# Patient Record
Sex: FEMALE | Race: WHITE
[De-identification: names, ages, dates, MRNs, and addresses within clinical notes are randomized per-mention and may not be internally consistent; named-entity substitution may affect disease eponyms.]

---

## 2018-10-15 ENCOUNTER — HOSPITAL ENCOUNTER (INPATIENT)
Dept: HOSPITAL 74 - JLDR | Age: 28
LOS: 3 days | Discharge: HOME | End: 2018-10-18
Attending: OBSTETRICS & GYNECOLOGY | Admitting: OBSTETRICS & GYNECOLOGY
Payer: COMMERCIAL

## 2018-10-15 VITALS — BODY MASS INDEX: 32.9 KG/M2

## 2018-10-15 DIAGNOSIS — O99.113: ICD-10-CM

## 2018-10-15 DIAGNOSIS — D68.1: ICD-10-CM

## 2018-10-15 DIAGNOSIS — O34.211: Primary | ICD-10-CM

## 2018-10-15 DIAGNOSIS — D68.59: ICD-10-CM

## 2018-10-15 DIAGNOSIS — Z3A.39: ICD-10-CM

## 2018-10-15 DIAGNOSIS — Z30.2: ICD-10-CM

## 2018-10-15 DIAGNOSIS — O22.03: ICD-10-CM

## 2018-10-15 LAB
ARTERIAL BLOOD GAS PCO2: 55.9 MMHG (ref 35–45)
BASE EXCESS BLDA CALC-SCNC: -3.3 MEQ/L (ref -2–2)
PH BLDV: 7.34 [PH] (ref 7.32–7.42)
PO2 BLDA: 15.4 MMHG (ref 80–100)
SAO2 % BLDA: 28.6 % (ref 90–98.9)
VENOUS PC02: 43.1 MMHG (ref 38–52)
VENOUS PO2: 32.2 MMHG (ref 28–48)

## 2018-10-15 PROCEDURE — 0UL70DZ OCCLUSION OF BILATERAL FALLOPIAN TUBES WITH INTRALUMINAL DEVICE, OPEN APPROACH: ICD-10-PCS | Performed by: OBSTETRICS & GYNECOLOGY

## 2018-10-15 RX ADMIN — IBUPROFEN PRN MG: 800 INJECTION INTRAVENOUS at 18:17

## 2018-10-15 RX ADMIN — SODIUM CHLORIDE, SODIUM GLUCONATE, SODIUM ACETATE, POTASSIUM CHLORIDE, AND MAGNESIUM CHLORIDE SCH MLS/HR: 526; 502; 368; 37; 30 INJECTION, SOLUTION INTRAVENOUS at 08:30

## 2018-10-15 NOTE — OP
DATE OF OPERATION:  10/15/2018

 

PREOPERATIVE DIAGNOSIS:  Pregnancy at estimated gestational age of 39 weeks. 

Previous  section x3.  Sterilization.  

 

POSTOPERATIVE DIAGNOSIS:  Pregnancy at estimated gestational age of 39 weeks. 

Previous  section x3.  Sterilization.  

 

PROCEDURE:  Repeat low transverse  section, bilateral tubal ligation via

modified Hadley method.

 

SURGEON:  Kaleb Rae M.D.

 

ASSISTANT:  America Cason M.D. 

 

ANESTHESIOLOGIST:  Aleksey Varma M.D. 

 

ANESTHESIA:  Spinal.

 

COMPLICATIONS:  None.

 

INTRAVENOUS FLUIDS:  1800 mL.

 

URINE OUTPUT:  100 mL of clear urine at the end of the procedure.  

 

ESTIMATED BLOOD LOSS:  600 mL.

 

PATHOLOGY:  Placenta and fragments of bilateral fallopian tubes.  

 

FINDINGS:  Live baby girl in vertex presentation, no meconium in amniotic fluids,

nuchal cord wrapped around once, normal uterus, fallopian tubes, and ovaries.  

 

PROCEDURE:  The patient was met preoperatively.  Risks, benefits, and alternatives of

surgery were discussed in detail.  All questions were answered.  The patient was then

brought to the OR with the IV running.  She was placed on the surgical table in a

sitting position.  The spinal anesthesia was achieved without difficulty.  The

patient was then placed in the supine position with a leftward tilt.  A Dominguez

catheter was inserted and left to drain to gravity.  The patient was prepped and

draped in the usual sterile fashion.  A timeout procedure was conducted as per

standard protocol.  The surgeons then proceeded with the operation.  A Pfannenstiel

skin incision was made approximately 2 cm above the pubic symphysis along the prior

scar.  The incision was carried down to of fascia.  The fascia was incised in the

midline.  The incision was extended bilaterally using jay scissors.  The fascia was

dissected away from the rectus muscles superiorly and inferiorly using sharp

dissection.  The rectus muscles were  in the midline using sharp dissection.

 The peritoneum was identified and entered sharply.  The peritoneal incision was

extended superiorly and inferiorly using Metzenbaum scissors.  The bladder peritoneum

was then dissected away from the lower uterine segment.  The bladder was reflected

downwards.  The uterus was incised transversely in the lower uterine segment.  The

uterine incision was extended bilaterally using bandage scissors.  The baby was

delivered from vertex presentation without complication.  A nuchal cord was noted to

be wrapped around once and was released without difficulty.  The umbilical cord was

clamped and cut.  The the baby was crying spontaneously.  The baby was handed to the

waiting neonatologist.  The placenta was then expressed and delivered manually and

without complications.  The uterus was cleared of all clots and debris.  The uterine

incision was repaired using a 0 Biosyn suture with good hemostasis and approximation.

 The uterine incision was then imbricated using a secondary layer of closure with a 0

Biosyn suture.  The operative site was irrigated using copious amounts of normal

saline. Once the normal saline was aspirated, good hemostasis was confirmed.  The

left fallopian tube was then identified and followed to the fimbriated end.  The mid

portion of the left fallopian tube was grasped with [place] clamps.  A section of the

fallopian tube was suture ligated and excised with good hemostasis.  The right

fallopian tube was then identified and followed to the fimbriated end.  The right

fallopian tube was then grasped with the [place] clamp.  A mid section of the right

fallopian tube was suture ligated and excised with good hemostasis.  The uterus was

noted to be well contracted.  The operative site was once again examined and noted to

be hemostatic.  The abdominal peritoneum was closed using a 2-0 chromic suture with

the running stitch.  The rectus muscles were approximated in the midline using

several interrupted 2-0 chromic suture.  The fascia was closed using a 0 Vicryl

suture with a running stitch.  The subcutaneous adipose tissues were approximated to

eliminate dead space.  The skin was closed using a 4-0 Vicryl suture with the

subcutaneous stitch.  Sponge, lap, and needle counts were correct.  Patient was

tolerated procedure well and was transferred to recovery room awake and in stable

condition.  

 

 

MANJIT [id number]

DD: 10/15/2018 11:06

DT: 10/15/2018 23:35

Job #:  [id number]

## 2018-10-15 NOTE — HP
Past Medical History





- Primary Care Physician


PCP:: Kaleb Rae





- Admission


Chief Complaint: 29yo P3 with pregnancy at EGA 39w0d admitted for repeat C/S.


History of Present Illness: 





Prior  section x 3


Protein S deficiency


MTHFT heterozygous mutation


Decrease factor XI activity


Vaginal GBS (+)


History Source: Patient, Medical Record


Limitations to Obtaining History: No Limitations





- Past Medical History


CNS: No: Alzheimer's, CVA, Dementia, Migraine, Multiple Sclerosis, Peripheral 

Neuropathy, Parkinson's, Seizure, Syncope, TIA, Vertigo, Other


Cardiovascular: No: AFIB, Aneurysm, Aortic Insufficiency, Aortic Stenosis, CAD, 

CHF, Deep Vein Thrombosis, HTN, Hyperlipdemia, MI, Mitral Insufficiency, Mitral 

Stenosis, Murmur, Pulmonary Hypertension, Other


Pulmonary: No: Asthma, Bronchitis, Cancer, COPD, O2 Dependent, Pneumonia, 

Previously Intubated, Pulmonary Embolus, Pulmonary Fibrosis, Sleep Apnea, Other


Gastrointestinal: No: Ascites, Cancer, Constipation, Crohn's Disease, 

Diverticulitis, Diverticulosis, Esophageal Varices, Gastritis, GERD, GI Bleed, 

Hemorrhoids, Hiatal Hernia, Inflamatory Bowel Disease, Irritable Bowel Disease, 

Pancreatitis, Peptic Ulcer Disease, Ulcerative Colitis, Other


Hepatobiliary: No: Cirrhosis, Cholelithiasis, Cholecystitis, Choledocholithiasis

, Hepatitis A, Hepatitis B, Hepatitis C, Other


Renal/: No: Renal Failure, Renal Inusuff, BPH, Cancer, Hematuria, Hemodialysis

, Neurogenic Bladder, Renal Calculi, UTI, Other


Reproductive: No: Ectopic Pregnancy, Endometriosis, Fibroids, PID, Polycystic 

Ovary Syndrome, Postmenopausal, Other


...: 4


...Para: 3 (C/S x 3)


...Term: 1


...: 2


...Spon : 0


...Induced : 0


...Multiple Gestation: 0


...LMP: 18


... Weeks Gestation by Dates: 39


...EDC by Dates: 10/23/18


...EDC by Sono: 10/22/18


Heme/Onc: No: Anemia, B12 Deficiency, Bleeding Disorder, Cancer, Current 

Chemotherapy, Current Radiation Therapy, Hemochromatosis, Hypercoaguable State, 

Myeloproliferative Synd, Sickle Cell Disease, Sickle Cell Trait, 

Thrombocytopenia, Other


Infectious Disease: No: AIDS, C-Diff, Herpes Zoster, HIV, MRSA, STD's, 

Tuberculosis, VREF, Other


Psych: No: Addictions, Anxiety, Bipolar, Depression, Panic, Psychosis, 

Schizophrenia, Other


Musculoskeletal: No: Bursitis, Chronic low back pain, Hemiparesis, Hemiplegia, 

Osteoarthritis, Paraplegia, Other


Rheumatology: No: Fibromyalgia, Gout, Lupus, Rheumatoid Arthritis, Sarcoidosis, 

Vasculitis, Other


ENT: No: Allergic Rhinitis, Sinusitis, Other


Endocrine: No: Demarcus's Disease, Cushing's Disease, Diabetes Insipidus, 

Diabetes Mellitus, Hyperparathyroidism, Hyperthyroidism, Hypothyroidism, 

Osteopenia, SIADH, Other


Dermatology: No: Basal Cell, Cellulitis, Eczema, Melanoma, Psoriasis, Squamous 

Cell, Other





- Past Surgical History


Past Surgical History: Yes: 


Hx Myomectomy: No


Hx Transabdominal Cerclage: No





- Smoking History


Smoking history: Unknown if ever smoked


Have you smoked in the past 12 months: No


Aproximately how many cigarettes per day: 0





- Alcohol/Substance Use


Hx Alcohol Use: No


History of Substance Use: reports: None





- Social History


Usual Living Arrangement: Yes: With Spouse, With Child


ADL: Independent


Occupation: Ophthalmology tech


History of Recent Travel: No





Home Medications





- Allergies


Allergies/Adverse Reactions: 


 Allergies











Allergy/AdvReac Type Severity Reaction Status Date / Time


 


No Known Allergies Allergy   Verified 10/15/18 08:35














- Home Medications


Home Medications: 


Ambulatory Orders





Prenatal Vitamins (Sjr) - [Prenatal .Vitamins *Rx*] 1 tab PO DAILY 13 


Ferrous Sulfate 1 tab PO DAILY 09/10/18 











Family Disease History





- Family Disease History


Family History: Unremarkable





Review of Systems





- Review of Systems


Constitutional: reports: No Symptoms


Eyes: reports: No Symptoms


HENT: reports: No Symptoms


Neck: reports: No Symptoms


Cardiovascular: reports: No Symptoms


Respiratory: reports: No Symptoms


Gastrointestinal: reports: No Symptoms


Genitourinary: reports: No Symptoms


Breasts: reports: No Symptoms Reported


Musculoskeletal: reports: No Symptoms


Integumentary: reports: No Symptoms


Neurological: reports: No Symptoms


Endocrine: reports: No Symptoms


Hematology/Lymphatic: reports: No Symptoms


Psychiatric: reports: No Symptoms


Pain Intensity: 0





Physical Exam - Maternity


Vital Signs: 


 Vital Signs











Temperature  98.5 F   10/15/18 07:15


 


Pulse Rate  76   10/15/18 07:15


 


Respiratory Rate  20   10/15/18 07:15


 


Blood Pressure  113/71   10/15/18 07:15


 


O2 Sat by Pulse Oximetry (%)      











Constitutional: Yes: Well Nourished, No Distress, Calm


Eyes: Yes: WNL, Conjunctiva Clear


HENT: Yes: WNL, Atraumatic, Normocephalic


Neck: Yes: WNL, Supple, Trachea Midline


Cardiovascular: Yes: WNL, Regular Rate and Rhythm


Lungs: Clear to auscultation, Normal air movement


Breast(s): Yes: WNL





- Abdominal Exam/OB


Fundal Height: 39


Number of Fetuses: Single


Fetal Presentation: Vertex


Contractions: No


Regularity: Irritability


Fetal Monitor Mode: External


Fetal Heart Rate (range): 135


Fetal Heart Rate Location: Midline


Category: I


Accelerations: Uniform


Decelerations: None





- Vaginal Exam/OB


Vaginal Bleediing: No


Speculum Exam: No


Amniotic Membrane Status: Intact


Fetal Presentation: Vertex/Position





- Physical Exam


Musculoskeletal: Yes: WNL


Extremities: Yes: WNL


Edema: Yes


Edema: LLE: Trace, RLE: Trace (Varicose veins)


Integumentary: Yes: WNL


Deep Tendon Reflex Grade: Normal +2


...Motor Strength: WNL


Psychiatric: Yes: WNL, Alert, Oriented





Hemorrhage Risk Assessment





- Risk Factors


Medium Risk Factors: Yes: Prior , uterine surgery,or multiple 

laparotomies


High Risk Factors: Yes: None


Risk Score: 1


Risk Level: Medium Risk





Imaging





- Results


Ultrasound: Report Reviewed





Assessment/Plan





29yo P3 with pregnancy at EGA 39w0d admitted for repeat  section and 

sterilization. The pt has a prior h/o C/S x 3. We discussed the risks and 

benefits of C/S at length, including but not limited to scarring, pain, bleeding

, infection, injury to underlying organs and structures, need for additional 

surgery to repair/treat any problems or complications, fetal complications/

injuries, etc. The pt verbalized her understanding and requested to proceed 

with surgery. The pt is aware that all surgeries have risks and no guarantees 

can be provided.

## 2018-10-15 NOTE — OP
Operative Note





- Note:


Operative Date: 10/15/18


Pre-Operative Diagnosis: Pregnancy at EGA 39wk, prior C/S x 3, Sterilization


Operation: Repeat LT C/S, BTL


Findings: 





Live baby girl in vtx presentation, no meconium in amniotic fluids, normal 

uterus/tubes/ovaries. APGAR 9/9


Post-Operative Diagnosis: Same as Pre-op


Surgeon: Kaleb Rae


Assistant: America Cason


Anesthesiologist/CRNA: Aleksey Varma


Anesthesia: Spinal


Specimens Removed: Placenta, fragments of bilateral Fallopian tubes


Estimated Blood Loss (mls): 600


Drains & Tubes with Location: Dominguez cath


Drains, Volume Out (mls): 100


Blood Volume Replaced (mls): 0


Fluid Volume Replaced (mls): 1,800


Operative Report Dictated: Yes

## 2018-10-15 NOTE — PN
Delivery





- Delivery


 Section: Repeat, Low Flap Transverse


Type of Anesthesia: Spinal


Episiotomy/Laceration: None


EBL (cc): 600





Delivery, Single Birth





- Stages of Labor


Date of Delivery: 10/15/18


Time of Delivery: 09:56


Date Placenta Delivered: 10/15/18


Time Placenta Delivered: :57


Placenta: Yes: Expressed, Manual Removal, Normal Configuration





- Condition of Infant


Pediatrician/Neonatologist Present: Yes


Infant Gender: Female


Birth Weight: 3.489 kg


Position: Right, OT


Total Hours ROM (Hrs/Mins): 0/1





- Apgar


  ** 1 Minute


Apgar Total Score: 9





  ** 5 Minutes


Apgar Total Score: 9





- [place] Feeding Plan


Initial Plan: Exclusive breastfeeding throughout hospitalization


Benefits of Breastfeeding Exclusively reinforced: Yes





Remarks





- Remarks


Remarks: 





BTL done

## 2018-10-16 LAB
BASOPHILS # BLD: 0.2 % (ref 0–2)
DEPRECATED RDW RBC AUTO: 17.3 % (ref 11.6–15.6)
EOSINOPHIL # BLD: 0.8 % (ref 0–4.5)
HCT VFR BLD CALC: 26.3 % (ref 32.4–45.2)
HGB BLD-MCNC: 8.3 GM/DL (ref 10.7–15.3)
LYMPHOCYTES # BLD: 16.3 % (ref 8–40)
MCH RBC QN AUTO: 25.2 PG (ref 25.7–33.7)
MCHC RBC AUTO-ENTMCNC: 31.7 G/DL (ref 32–36)
MCV RBC: 79.5 FL (ref 80–96)
MONOCYTES # BLD AUTO: 7.2 % (ref 3.8–10.2)
NEUTROPHILS # BLD: 75.5 % (ref 42.8–82.8)
PLATELET # BLD AUTO: 257 K/MM3 (ref 134–434)
PMV BLD: 7.6 FL (ref 7.5–11.1)
RBC # BLD AUTO: 3.3 M/MM3 (ref 3.6–5.2)
WBC # BLD AUTO: 9.7 K/MM3 (ref 4–10)

## 2018-10-16 RX ADMIN — SIMETHICONE CHEW TAB 80 MG PRN MG: 80 TABLET ORAL at 18:25

## 2018-10-16 RX ADMIN — SIMETHICONE CHEW TAB 80 MG PRN MG: 80 TABLET ORAL at 05:07

## 2018-10-16 RX ADMIN — ENOXAPARIN SODIUM SCH MG: 40 INJECTION SUBCUTANEOUS at 10:00

## 2018-10-16 RX ADMIN — Medication SCH TAB: at 09:20

## 2018-10-16 RX ADMIN — IBUPROFEN PRN MG: 600 TABLET, FILM COATED ORAL at 14:29

## 2018-10-16 RX ADMIN — SODIUM CHLORIDE, SODIUM GLUCONATE, SODIUM ACETATE, POTASSIUM CHLORIDE, AND MAGNESIUM CHLORIDE SCH: 526; 502; 368; 37; 30 INJECTION, SOLUTION INTRAVENOUS at 10:23

## 2018-10-16 RX ADMIN — IBUPROFEN PRN MG: 800 INJECTION INTRAVENOUS at 00:38

## 2018-10-16 RX ADMIN — SIMETHICONE CHEW TAB 80 MG PRN MG: 80 TABLET ORAL at 23:28

## 2018-10-16 RX ADMIN — ACETAMINOPHEN PRN MG: 325 TABLET ORAL at 09:18

## 2018-10-16 RX ADMIN — IBUPROFEN PRN MG: 600 TABLET, FILM COATED ORAL at 09:15

## 2018-10-16 RX ADMIN — IBUPROFEN PRN MG: 600 TABLET, FILM COATED ORAL at 05:12

## 2018-10-16 RX ADMIN — IBUPROFEN PRN MG: 600 TABLET, FILM COATED ORAL at 23:29

## 2018-10-16 RX ADMIN — IBUPROFEN PRN MG: 600 TABLET, FILM COATED ORAL at 18:24

## 2018-10-16 RX ADMIN — SIMETHICONE CHEW TAB 80 MG PRN MG: 80 TABLET ORAL at 14:30

## 2018-10-16 RX ADMIN — SIMETHICONE CHEW TAB 80 MG PRN MG: 80 TABLET ORAL at 09:20

## 2018-10-16 RX ADMIN — ACETAMINOPHEN PRN MG: 325 TABLET ORAL at 05:07

## 2018-10-16 NOTE — PN
Progress Note (short form)





- Note


Progress Note: 





POD #1 - s/p  under spinal anesthesia with duramorph. VSS. Pt. doing 

well, sitting up comfortably in 


chair after taking a walk. No complaints. No apparent anesthetic complications 

noted. Continue current care.

## 2018-10-16 NOTE — PN
Post Partum Progress Note


Post Partum Day: 1


Type of Delivery: Repeat C/S


Vital Signs: 


 Vital Signs











Temperature  98.2 F   10/16/18 06:00


 


Pulse Rate  64   10/16/18 06:00


 


Respiratory Rate  18   10/16/18 06:00


 


Blood Pressure  102/51 L  10/16/18 06:00


 


O2 Sat by Pulse Oximetry (%)  100   10/15/18 21:00











Breast Exam: Yes: Soft


Uterus: Yes: Fundus Firm


Incision: Yes: Dressing dry and intact


Abdomen/GI: Yes: Abdomen soft


Lochia: Yes: Rubra


Lochia, amount: Small


Extremities: Yes: Calves non-tender


Perineum: Yes: Intact


Activity: Ambulating





Assessment/Plan





s/p c/section


doing well vss, afebrile


continue routine PP care

## 2018-10-17 RX ADMIN — SIMETHICONE CHEW TAB 80 MG PRN MG: 80 TABLET ORAL at 20:46

## 2018-10-17 RX ADMIN — SIMETHICONE CHEW TAB 80 MG PRN MG: 80 TABLET ORAL at 15:00

## 2018-10-17 RX ADMIN — IBUPROFEN PRN MG: 600 TABLET, FILM COATED ORAL at 15:00

## 2018-10-17 RX ADMIN — SIMETHICONE CHEW TAB 80 MG PRN MG: 80 TABLET ORAL at 09:29

## 2018-10-17 RX ADMIN — Medication SCH TAB: at 09:29

## 2018-10-17 RX ADMIN — IBUPROFEN PRN MG: 600 TABLET, FILM COATED ORAL at 20:46

## 2018-10-17 RX ADMIN — IBUPROFEN PRN MG: 600 TABLET, FILM COATED ORAL at 09:28

## 2018-10-17 RX ADMIN — IBUPROFEN PRN MG: 600 TABLET, FILM COATED ORAL at 04:44

## 2018-10-17 RX ADMIN — ENOXAPARIN SODIUM SCH MG: 40 INJECTION SUBCUTANEOUS at 09:29

## 2018-10-17 RX ADMIN — SIMETHICONE CHEW TAB 80 MG PRN MG: 80 TABLET ORAL at 04:43

## 2018-10-18 VITALS — DIASTOLIC BLOOD PRESSURE: 71 MMHG | SYSTOLIC BLOOD PRESSURE: 124 MMHG | TEMPERATURE: 98.3 F | HEART RATE: 81 BPM

## 2018-10-18 LAB
BASOPHILS # BLD: 0.2 % (ref 0–2)
DEPRECATED RDW RBC AUTO: 17.3 % (ref 11.6–15.6)
EOSINOPHIL # BLD: 1.3 % (ref 0–4.5)
HCT VFR BLD CALC: 26.8 % (ref 32.4–45.2)
HGB BLD-MCNC: 8.2 GM/DL (ref 10.7–15.3)
LYMPHOCYTES # BLD: 13.8 % (ref 8–40)
MCH RBC QN AUTO: 24.3 PG (ref 25.7–33.7)
MCHC RBC AUTO-ENTMCNC: 30.6 G/DL (ref 32–36)
MCV RBC: 79.4 FL (ref 80–96)
MONOCYTES # BLD AUTO: 6.2 % (ref 3.8–10.2)
NEUTROPHILS # BLD: 78.5 % (ref 42.8–82.8)
PLATELET # BLD AUTO: 296 K/MM3 (ref 134–434)
PMV BLD: 7.1 FL (ref 7.5–11.1)
RBC # BLD AUTO: 3.38 M/MM3 (ref 3.6–5.2)
WBC # BLD AUTO: 13.4 K/MM3 (ref 4–10)

## 2018-10-18 RX ADMIN — SIMETHICONE CHEW TAB 80 MG PRN MG: 80 TABLET ORAL at 04:31

## 2018-10-18 RX ADMIN — IBUPROFEN PRN MG: 600 TABLET, FILM COATED ORAL at 04:32

## 2018-10-18 RX ADMIN — Medication SCH TAB: at 09:22

## 2018-10-18 RX ADMIN — ENOXAPARIN SODIUM SCH MG: 40 INJECTION SUBCUTANEOUS at 09:22

## 2018-10-18 RX ADMIN — IBUPROFEN PRN MG: 600 TABLET, FILM COATED ORAL at 09:21

## 2018-10-18 RX ADMIN — SIMETHICONE CHEW TAB 80 MG PRN MG: 80 TABLET ORAL at 09:22

## 2018-10-18 NOTE — DS
Physical Exam-GYN


Vital Signs: 


 Vital Signs











Temperature  98.3 F   10/18/18 07:30


 


Pulse Rate  81   10/18/18 07:30


 


Respiratory Rate  18   10/18/18 07:30


 


Blood Pressure  124/71   10/18/18 07:30


 


O2 Sat by Pulse Oximetry (%)  100   10/15/18 21:00











Constitutional: Yes: Well Nourished, No Distress, Calm


Eyes: Yes: WNL, Conjunctiva Clear, EOM Intact


HENT: Yes: WNL, Atraumatic, Normocephalic


Neck: Yes: WNL, Supple, Trachea Midline


Cardiovascular: Yes: WNL, Regular Rate and Rhythm


Respiratory: Yes: WNL, Regular, CTA Bilaterally


Gastrointestinal: Yes: WNL, Normal Bowel Sounds, Soft


...Rectal Exam: Yes: Deferred


Renal/: Yes: WNL


....Post Partum: Yes: Uterus firm, Uterus non-tender, Slight lochia rubra


Breast(s): Yes: WNL


Musculoskeletal: Yes: WNL


Extremities: Yes: WNL


Edema: Yes


Edema: LLE: Trace, RLE: Trace


Integumentary: Yes: WNL


Wound/Incision: Yes: Clean/Dry, Well Approximated, Sutures Intact, Steri Strips

, Open to air


Neurological: Yes: WNL, Alert, Oriented


...Motor Strength: WNL


Psychiatric: Yes: WNL, Alert, Oriented


Labs: 


 CBC, BMP





 10/18/18 07:15 











Delivery





- Delivery


 Section: Repeat, Low Flap Transverse


Type of Anesthesia: Spinal


Episiotomy/Laceration: None


EBL (cc): 600





Delivery, Single Birth





- Stages of Labor


Date of Delivery: 10/15/18


Time of Delivery: 09:56


Time Placenta Delivered: 09:57


Placenta: Yes: Expressed, Manual Removal, Normal Configuration





- Condition of Infant


Pediatrician/Neonatologist Present: Yes


Name: Elba Sheriff


Infant Gender: Female


Birth Weight: 3.489 kg


Position: Right, OT


Total Hours ROM (Hrs/Mins): 0/1





- Apgar


  ** 1 Minute


Apgar Total Score: 9





  ** 5 Minutes


Apgar Total Score: 9





-  Feeding Plan


Initial Plan: Exclusive breastfeeding throughout hospitalization


Benefits of Breastfeeding Exclusively reinforced: Yes





Discharge Summary


Reason For Visit: C SEC


Current Active Problems





Anemia (Acute)








Procedures: Principal: Repeat LT C/S


Other Procedures: BTL


Hospital Course: 





Normal recovery


Condition: Good





- Instructions


Diet, Activity, Other Instructions: 





Physical activity


Resume your normal everyday activity as tolerated no heavy lifting or exercise 

until seen by your surgeon. You may walk unlimited swtea of and climb stairs. 

You may resume driving the car when you feel safe and comfortable behind the 

wheel. No sexual activity as instructed.





Wound care


If you have a bandage, leave it on, and keep dry for 48-72 hours. After that 

time discard the outer bandage. If they are tapes on the skin under the out of 

bandage leave them in place. They will peel off in the next 7 to 10 days. Do 

Not Peel them off. You may shower the day after surgery. If there are tapes 

present on the skin, you may shower over them.





Diet


There are no dietary restrictions. Eat healthy, high-fiber foods. Drink 6 to 8 

glasses of liquid each day. This will assist in keeping your bowels are regular.





Pain management


You may take Tylenol or acetaminophen or Ibuprofen (for example, Motrin, Advil 

etc.) from my pain prescription medication is ordered should be taken as 

prescribed for moderate to severe pain.





Call MD for any of the following:


Severe pain not relieved by medication


Fever of 101 or higher


Excessive bleeding or drainage on dressing


Inability to urinate





[facility]


Reference #: [id number]





Call Dr. Rae and make appt. to be seen in one week.











Referrals: 


Kaleb Rae MD [Staff Physician] - 


Disposition: HOME





- Home Medications


Comprehensive Discharge Medication List: 


Ambulatory Orders





Prenatal Vitamins (Sjr) - [Prenatal .Vitamins *Rx*] 1 tab PO DAILY 13 


Ferrous Sulfate 1 tab PO DAILY 09/10/18 


Oxycodone HCl/Acetaminophen [Percocet 5-325 mg Tablet] 1 tab PO Q6H #15 tablet 

MDD 4 10/18/18

## 2018-10-18 NOTE — PN
Post Partum Progress Note





- Subjective


Subjective: 


Patient without acute complaints. 


Reports tolerating oral intake without nausea or vomiting. 


Ambulating without dizziness. 


Denies fevers or chills.


Pain well controlled with oral pain medication.


Breastfeeding without difficulty.


Passing flatus.





Post Partum Day: 3


Type of Delivery: Repeat C/S


Vital Signs: 


 Vital Signs











Temperature  99 F   10/17/18 22:00


 


Pulse Rate  100 H  10/17/18 22:00


 


Respiratory Rate  18   10/17/18 22:00


 


Blood Pressure  123/78   10/17/18 22:00


 


O2 Sat by Pulse Oximetry (%)  100   10/15/18 21:00











Breast Exam: Yes: Engorged


Uterus: Yes: Fundus Firm, Fundus below umbilicus


Incision: Yes: Sutures intact.  No: Redness, Oozing


Abdomen/GI: Yes: Abdomen soft, Tender (mild incisional), Passing flatus, 

Tolerating PO.  No: Abdominal Distention


Lochia: Yes: Serosa


Lochia, amount: Small


Extremities: Yes: Calves non-tender, Edema (+1)


Activity: Ambulating





- Labs


Labs: 


 CBC











WBC  9.7 K/mm3 (4.0-10.0)   10/16/18  07:54    


 


RBC  3.30 M/mm3 (3.60-5.2)  L  10/16/18  07:54    


 


Hgb  8.3 GM/dL (10.7-15.3)  L  10/16/18  07:54    


 


Hct  26.3 % (32.4-45.2)  L D 10/16/18  07:54    


 


MCV  79.5 fl (80-96)  L  10/16/18  07:54    


 


MCH  25.2 pg (25.7-33.7)  L  10/16/18  07:54    


 


MCHC  31.7 g/dl (32.0-36.0)  L  10/16/18  07:54    


 


RDW  17.3 % (11.6-15.6)  H  10/16/18  07:54    


 


Plt Count  257 K/MM3 (134-434)  D 10/16/18  07:54    


 


MPV  7.6 fl (7.5-11.1)   10/16/18  07:54    


 


Absolute Neuts (auto)  7.3 K/mm3 (1.5-8.0)   10/16/18  07:54    


 


Neutrophils %  75.5 % (42.8-82.8)   10/16/18  07:54    


 


Lymphocytes %  16.3 % (8-40)  D 10/16/18  07:54    


 


Monocytes %  7.2 % (3.8-10.2)   10/16/18  07:54    


 


Eosinophils %  0.8 % (0-4.5)   10/16/18  07:54    


 


Basophils %  0.2 % (0-2.0)   10/16/18  07:54    


 


Nucleated RBC %  0 % (0-0)   10/16/18  07:54    














Assessment/Plan





29 yo POD # 3 s/p R CD + BTL, afebrile, vital signs stable, mild asymptomatic 

anemia, doing well 


1. Patient stable for discharge home today.


2. Patient encouraged to contact MD for:


- Severe pain not controlled by oral pain medication


- Fevers or chills


- Nausea or vomiting, intolerance of oral intake


- Incision redness, tenderness or discharge


3. Patient to follow up in office in 1-2 weeks for incision check, 4-6 weeks 

for postpartum visit

## 2018-10-24 NOTE — PATH
Surgical Pathology Report



Patient Name:  ОЛЕГ FLEMING

Accession #:  [id number]

Med. Rec. #:  [id number]                                                        

   /Age/Gender:  1990 (Age: 28) / F

Account:  [id number]                                                          

             Location: [facility] OBS/GYN

Taken:  10/15/2018

Received:  10/16/2018

Reported:  10/24/2018

Physicians:  Kaleb Rae M.D.

  



Specimen(s) Received

A: PLACENTA 

B: RIGHT FALLOPIAN TUBE 

C: LEFT FALLOPIAN TUBE 





Clinical History

Protein S Deficiency

+MTHFR, +GBS









Final Diagnosis

A.  PLACENTA,  SECTION:

511 G THIRD TRIMESTER PLACENTA WITH TRIVASCULAR UMBILICAL CORD, FOCAL

INTRAPARENCHYMAL HEMORRHAGE (~10% OF PLACENTAL SURFACE), AND UNREMARKABLE

PLACENTAL MEMBRANES.



B.  FALLOPIAN TUBE, RIGHT, PARTIAL EXCISION:

FULL LUMINAL PORTION OF FALLOPIAN TUBE WITH BENIGN WALTHARD CELL NESTS AND CYST.



C.  FALLOPIAN TUBE, LEFT, PARTIAL EXCISION:

FULL LUMINAL PORTION OF UNREMARKABLE FALLOPIAN TUBE.







***Electronically Signed***

Jania Russell M.D.





Gross Description

A.  The specimen is received fresh labeled placenta and is a 511 gram, 17.0 x

15.0 x 3.3 cm. placenta with attached membranes and umbilical cord.  The

attached membranes are tan, translucent with focal opacities and insert

marginally.  The umbilical cord measures 10 cm. in length and averages 1.2 cm.

in diameter.  The cord inserts eccentrically, 3.5 cm. to the nearest margin.  No

true knots or strictures are identified. Cut surface of the umbilical cord

reveals 3 vessels. The fetal surface is grey-blue with minimal fibrin deposition

and appropriate caliber vessels.  The maternal surface is red-brown with focal

defects.  Sectioning reveals a 1.4 cm in greatest dimension tan-red

intraparenchymal lesion. The remaining placental parenchyma is red-brown and

spongy. Representative sections are submitted in 4 cassettes as follows: 1-

membrane rolls and umbilical cord; 2-lesion; 3-4-full thickness sections of

placenta.



B. Received in formalin labeled "right tube," is a 1.2 cm in length portion of

fallopian tube. No fimbria are present. The outer surface is tan and smooth.

Sectioning reveals an unremarkable lumen. Representative sections are submitted

in one cassette.



C. Received in formalin labeled "left tube," is a 1.5 cm in length portion of

fallopian tube. No fimbria are present. The outer surface is tan and smooth.

Sectioning reveals an unremarkable lumen. Representative sections are submitted

in one cassette.





saudi/10/23/2018